# Patient Record
Sex: FEMALE | Race: WHITE | Employment: FULL TIME | URBAN - METROPOLITAN AREA
[De-identification: names, ages, dates, MRNs, and addresses within clinical notes are randomized per-mention and may not be internally consistent; named-entity substitution may affect disease eponyms.]

---

## 2017-01-27 ENCOUNTER — ALLIED HEALTH/NURSE VISIT (OUTPATIENT)
Dept: FAMILY MEDICINE | Facility: CLINIC | Age: 57
End: 2017-01-27

## 2017-01-27 VITALS — DIASTOLIC BLOOD PRESSURE: 84 MMHG | SYSTOLIC BLOOD PRESSURE: 154 MMHG

## 2017-01-27 DIAGNOSIS — I10 ESSENTIAL HYPERTENSION WITH GOAL BLOOD PRESSURE LESS THAN 130/85: Primary | ICD-10-CM

## 2017-01-27 NOTE — PROGRESS NOTES
Keisha Mcrae is enrolled/participating in the retail pharmacy Blood Pressure Goals Achievement Program (BPGAP).  Keisha Mcrae was evaluated at [facility] on January 27, 2017 at which time her blood pressure was:    BP Readings from Last 3 Encounters:   01/27/17 166/92   10/03/16 150/80   09/12/16 160/84     Reviewed lifestyle modifications for blood pressure control and reduction: including making healthy food choices, managing weight, getting regular exercise, smoking cessation, reducing alcohol consumption, monitoring blood pressure regularly.     Keisha Mcrae is not experiencing symptoms.    Follow-Up: BP is not at goal of < 140/90mmHg (patient 18+ years of age with or without diabetes), Recommended follow-up with PCP.  Routing to PCP for further review.    Completed by: Kelly Van, Pharm.D  Pharmacist in Charge  [facility]

## 2017-01-29 NOTE — PROGRESS NOTES
Records reviewed; BLOOD PRESSURE not to goal. It has been many months without adequate control. Medication list reviewed. List indicates many bp medications being used without success, including BB, CCB, ACE, Diuretic.  Follow up appt needed to reassess possible etiologies of her elevated bp and treatment options.    BP Readings from Last 3 Encounters:   01/27/17 154/84   10/03/16 150/80   09/12/16 160/84      Current Outpatient Prescriptions   Medication Sig Dispense Refill     metoprolol (LOPRESSOR) 25 MG tablet Take 1 tablet (25 mg) by mouth 2 times daily 60 tablet 3     citalopram (CELEXA) 10 MG tablet Take 1 tablet (10 mg) by mouth daily 90 tablet 1     hydrochlorothiazide (HYDRODIURIL) 25 MG tablet Take 1 tablet (25 mg) by mouth daily 30 tablet 3     lisinopril (PRINIVIL,ZESTRIL) 40 MG tablet Take 1 tablet (40 mg) by mouth daily 30 tablet 3     amLODIPine (NORVASC) 10 MG tablet Take 1 tablet (10 mg) by mouth daily 30 tablet 3        Lula Cuba MD  Internal Medicine  electronically signed

## 2017-02-07 ENCOUNTER — TELEPHONE (OUTPATIENT)
Dept: PHARMACY | Facility: CLINIC | Age: 57
End: 2017-02-07

## 2017-02-24 ENCOUNTER — DOCUMENTATION ONLY (OUTPATIENT)
Dept: FAMILY MEDICINE | Facility: CLINIC | Age: 57
End: 2017-02-24

## 2017-02-24 NOTE — PROGRESS NOTES
Panel Management Review      Patient has the following on her problem list:     Hypertension   Last three blood pressure readings:  BP Readings from Last 3 Encounters:   01/27/17 154/84   10/03/16 150/80   09/12/16 160/84     Blood pressure: Passed    HTN Guidelines:  Age 18-59 BP range:  Less than 140/90  Age 60-85 with Diabetes:  Less than 140/90  Age 60-85 without Diabetes:  less than 150/90      Composite cancer screening  Chart review shows that this patient is due/due soon for the following Colonoscopy  Summary:    Patient is due/failing the following:   COLONOSCOPY    Action needed:   Patient needs office visit for Colonoscopy/FIT Test.    Type of outreach:    Sent [employer] message.    Questions for provider review:    None                                                                                                                                    Adeline Elizondo CMA (AAMA) 2/24/2017 7:49 AM       Chart routed to  .

## 2017-02-27 ENCOUNTER — OFFICE VISIT (OUTPATIENT)
Dept: FAMILY MEDICINE | Facility: CLINIC | Age: 57
End: 2017-02-27
Payer: COMMERCIAL

## 2017-02-27 VITALS
OXYGEN SATURATION: 99 % | HEART RATE: 75 BPM | RESPIRATION RATE: 18 BRPM | SYSTOLIC BLOOD PRESSURE: 144 MMHG | TEMPERATURE: 97.9 F | WEIGHT: 225.7 LBS | BODY MASS INDEX: 36.99 KG/M2 | DIASTOLIC BLOOD PRESSURE: 86 MMHG

## 2017-02-27 DIAGNOSIS — Z12.11 SCREEN FOR COLON CANCER: ICD-10-CM

## 2017-02-27 DIAGNOSIS — F41.9 ANXIETY: ICD-10-CM

## 2017-02-27 DIAGNOSIS — I10 ESSENTIAL HYPERTENSION WITH GOAL BLOOD PRESSURE LESS THAN 130/85: Primary | ICD-10-CM

## 2017-02-27 PROCEDURE — 99214 OFFICE O/P EST MOD 30 MIN: CPT | Performed by: INTERNAL MEDICINE

## 2017-02-27 RX ORDER — METOPROLOL TARTRATE 25 MG/1
25 TABLET, FILM COATED ORAL 2 TIMES DAILY
Qty: 60 TABLET | Refills: 3 | Status: CANCELLED | OUTPATIENT
Start: 2017-02-27

## 2017-02-27 RX ORDER — CITALOPRAM HYDROBROMIDE 10 MG/1
10 TABLET ORAL DAILY
Qty: 90 TABLET | Refills: 1 | Status: SHIPPED | OUTPATIENT
Start: 2017-02-27 | End: 2017-06-15

## 2017-02-27 RX ORDER — METOPROLOL TARTRATE 50 MG
50 TABLET ORAL 2 TIMES DAILY
Qty: 180 TABLET | Refills: 1 | Status: SHIPPED | OUTPATIENT
Start: 2017-02-27 | End: 2017-06-15

## 2017-02-27 ASSESSMENT — ANXIETY QUESTIONNAIRES
7. FEELING AFRAID AS IF SOMETHING AWFUL MIGHT HAPPEN: NOT AT ALL
6. BECOMING EASILY ANNOYED OR IRRITABLE: SEVERAL DAYS
1. FEELING NERVOUS, ANXIOUS, OR ON EDGE: MORE THAN HALF THE DAYS
5. BEING SO RESTLESS THAT IT IS HARD TO SIT STILL: SEVERAL DAYS
IF YOU CHECKED OFF ANY PROBLEMS ON THIS QUESTIONNAIRE, HOW DIFFICULT HAVE THESE PROBLEMS MADE IT FOR YOU TO DO YOUR WORK, TAKE CARE OF THINGS AT HOME, OR GET ALONG WITH OTHER PEOPLE: SOMEWHAT DIFFICULT
GAD7 TOTAL SCORE: 6
2. NOT BEING ABLE TO STOP OR CONTROL WORRYING: NOT AT ALL
3. WORRYING TOO MUCH ABOUT DIFFERENT THINGS: SEVERAL DAYS

## 2017-02-27 ASSESSMENT — PATIENT HEALTH QUESTIONNAIRE - PHQ9: 5. POOR APPETITE OR OVEREATING: SEVERAL DAYS

## 2017-02-27 NOTE — PROGRESS NOTES
SUBJECTIVE:                                                    Keisha Mcrae is a 56 year old female who presents to clinic today for the following health issues:    HPI    Hypertension Follow-up    Outpatient blood pressures are being checked at home.  Results are 143-150/80-86.    Low Salt Diet: Not monitoring salt    Other concerns: Increased stressors at work have not only affected her mood, but they have also caused her blood pressure to remain elevated, despite using her medications as prescribed.     Anxiety Follow-Up    Status since last visit: Overall improved, but recent work stressors have been complicating things in her life, especially her anxiety and blood pressure.    Other associated symptoms:None    Complicating factors:   Significant life event: Work stressors, as noted.  Current substance abuse: None  Depression symptoms: No    JESS-7 SCORE 10/1/2014 4/18/2016 2/27/2017   Total Score 7 - -   Total Score - 4 6      GAD7     Problem list and histories reviewed & adjusted, as indicated.  Additional history: as documented    Labs reviewed in EPIC  Problem list, Medication list, Allergies, and Medical/Social/Surgical histories reviewed in [facility] and updated as appropriate.  Current Outpatient Prescriptions   Medication Sig Dispense Refill     metoprolol (LOPRESSOR) 25 MG tablet Take 1 tablet (25 mg) by mouth 2 times daily 60 tablet 3     citalopram (CELEXA) 10 MG tablet Take 1 tablet (10 mg) by mouth daily 90 tablet 1     hydrochlorothiazide (HYDRODIURIL) 25 MG tablet Take 1 tablet (25 mg) by mouth daily 30 tablet 3     lisinopril (PRINIVIL,ZESTRIL) 40 MG tablet Take 1 tablet (40 mg) by mouth daily 30 tablet 3     amLODIPine (NORVASC) 10 MG tablet Take 1 tablet (10 mg) by mouth daily 30 tablet 3        REVIEW OF SYSTEMS:  C: NEGATIVE for fever, chills, or change in weight  R: NEGATIVE for significant cough or SOB  CV: Hx of and POSITIVE for Hypertension - use of Amlodipine, Hydrochlorothiazide,  Lisinopril, and Metoprolol; NEGATIVE for chest pain, palpitations or peripheral edema  GI: no bowel changes;   : no urinary symptoms   N: NEGATIVE for weakness, dizziness or paresthesias  P: Anxiety - use of Citalopram, however, she has been out of Citalopram for about a month; NEGATIVE for changes in mood or affect    This document serves as a record of the services and decisions personally performed and made by Lula Cuba MD. It was created on her behalf by Divya Rodriguez, a trained medical scribe. The creation of this document is based the provider's statements to the medical scribe.  Divya Rodriguez, February 27, 2017 4:50 PM     OBJECTIVE:                                                    /86  Pulse 75  Temp 97.9  F (36.6  C) (Oral)  Resp 18  Wt 225 lb 11.2 oz (102.4 kg)  LMP 03/21/1999  SpO2 99%  BMI 36.99 kg/m2  There is no height or weight on file to calculate BMI.    EXAM:  GENERAL: Patient appears healthy, alert and not distressed.  EYES: Eyes appear grossly normal to inspection, with normal conjunctivae and sclerae  NECK: No adenopathy present, no asymmetry, masses, or scars noted, thyroid is normal to palpation  RESP: Lungs are clear to auscultation - no rales, rhonchi or wheezes present  CV: Regular rate and rhythm, normal S1 S2 heart sounds, no ectopy, no peripheral edema present, peripheral pulses normal, no carotid bruit.  NEURO: Patient exhibits normal strength and tone, normal speech and mentation  PSYCH: Mentation appears normal, affect is normal/bright    Diagnostic Test Results:  No results found for this or any previous visit (from the past 24 hour(s)).      ASSESSMENT/PLAN:                                                    (I10) Essential hypertension with goal blood pressure less than 130/85  (primary encounter diagnosis)  Comment: Blood pressure remains elevated despite the use of Amlodipine, Hydrochlorothiazide, Lisinopril, and Metoprolol; Increased her daily dose of Metoprolol  from 50 to 100 MG, which should help reduce not only her blood pressure but her increased anxiousness; Will continue to monitor.  Plan: metoprolol (LOPRESSOR) 50 MG tablet          (F41.9) Anxiety  Comment: Increased stressors at work; Mood has otherwise been good despite these stressors and having gone without Citalopram for an entire month; Requesting refills; No changes in medication or dosing; Will continue to monitor her mood.  Plan: citalopram (CELEXA) 10 MG tablet          (Z12.11) Screen for colon cancer  Comment: Due for additional colorectal cancer screening; Elects the FIT test as her primary screening tool; Will return as soon as she is able to complete it.  Plan: Fecal colorectal cancer screen (FIT)              The information in this document, created by a medical scribe for me, accurately reflects the services I personally performed and the decisions made by me. I have reviewed and approved this document for accuracy.  Dr. Lula Cuba, 5:03 PM, February 27, 2017    Lula Cuba MD  Internal Medicine   [facility]

## 2017-02-27 NOTE — MR AVS SNAPSHOT
After Visit Summary   2/27/2017    Keisha Mcrae    MRN: [id number]           Patient Information     Date Of Birth          1960        Visit Information        Provider Department      2/27/2017 4:15 PM Lula Cuba MD [facility]        Today's Diagnoses     Essential hypertension with goal blood pressure less than 130/85    -  1    Anxiety        Screen for colon cancer           Follow-ups after your visit        Future tests that were ordered for you today     Open Future Orders        Priority Expected Expires Ordered    Fecal colorectal cancer screen (FIT) Routine 3/20/2017 5/22/2017 2/27/2017            Who to contact     If you have questions or need follow up information about today's clinic visit or your schedule please contact [facility] directly at [phone].  Normal or non-critical lab and imaging results will be communicated to you by MyChart, letter or phone within 4 business days after the clinic has received the results. If you do not hear from us within 7 days, please contact the clinic through [employer] or phone. If you have a critical or abnormal lab result, we will notify you by phone as soon as possible.  Submit refill requests through [employer] or call your pharmacy and they will forward the refill request to us. Please allow 3 business days for your refill to be completed.          Additional Information About Your Visit        MyChart Information     [employer] gives you secure access to your electronic health record. If you see a primary care provider, you can also send messages to your care team and make appointments. If you have questions, please call your primary care clinic.  If you do not have a primary care provider, please call [phone] and they will assist you.        Care EveryWhere ID     This is your Care EveryWhere ID. This could be used by other organizations to access your [place] medical records  [id number]         Your Vitals Were     Pulse Temperature Respirations Last Period Pulse Oximetry BMI (Body Mass Index)    75 97.9  F (36.6  C) (Oral) 18 03/21/1999 99% 36.99 kg/m2       Blood Pressure from Last 3 Encounters:   02/27/17 144/86   01/27/17 154/84   10/03/16 150/80    Weight from Last 3 Encounters:   02/27/17 225 lb 11.2 oz (102.4 kg)   10/03/16 219 lb 14.4 oz (99.7 kg)   06/13/16 218 lb (98.9 kg)                 Today's Medication Changes          These changes are accurate as of: 2/27/17  5:01 PM.  If you have any questions, ask your nurse or doctor.               These medicines have changed or have updated prescriptions.        Dose/Directions    metoprolol 50 MG tablet   Commonly known as:  LOPRESSOR   This may have changed:    - medication strength  - how much to take   Used for:  Essential hypertension with goal blood pressure less than 130/85   Changed by:  Lula Cuba MD        Dose:  50 mg   Take 1 tablet (50 mg) by mouth 2 times daily   Quantity:  180 tablet   Refills:  1            Where to get your medicines      These medications were sent to [facility] [address]  [address] [facility] [address]     Phone:  [phone]     citalopram 10 MG tablet    metoprolol 50 MG tablet                Primary Care Provider Office Phone # Fax #    Lula Cuba [phone]       [facility] [address]  [facility] [address]        Thank you!     Thank you for choosing [facility]  for your care. Our goal is always to provide you with excellent care. Hearing back from our patients is one way we can continue to improve our services. Please take a few minutes to complete the written survey that you may receive in the mail after your visit with us. Thank you!             Your Updated Medication List - Protect others around you: Learn how to safely use, store and throw away your medicines at  www.disposemymeds.org.          This list is accurate as of: 2/27/17  5:01 PM.  Always use your most recent med list.                   Brand Name Dispense Instructions for use    amLODIPine 10 MG tablet    NORVASC    30 tablet    Take 1 tablet (10 mg) by mouth daily       citalopram 10 MG tablet    celeXA    90 tablet    Take 1 tablet (10 mg) by mouth daily       hydrochlorothiazide 25 MG tablet    HYDRODIURIL    30 tablet    Take 1 tablet (25 mg) by mouth daily       lisinopril 40 MG tablet    PRINIVIL/ZESTRIL    30 tablet    Take 1 tablet (40 mg) by mouth daily       metoprolol 50 MG tablet    LOPRESSOR    180 tablet    Take 1 tablet (50 mg) by mouth 2 times daily

## 2017-02-27 NOTE — NURSING NOTE
"Chief Complaint   Patient presents with     Hypertension     Anxiety       Initial BP (!) 162/100 (BP Location: Right arm, Patient Position: Chair, Cuff Size: Adult Large)  Pulse 75  Temp 97.9  F (36.6  C) (Oral)  Resp 18  Wt 225 lb 11.2 oz (102.4 kg)  LMP 03/21/1999  SpO2 99%  BMI 36.99 kg/m2 Estimated body mass index is 36.99 kg/(m^2) as calculated from the following:    Height as of 5/3/16: 5' 5.5\" (1.664 m).    Weight as of this encounter: 225 lb 11.2 oz (102.4 kg).  Medication Reconciliation: complete    "

## 2017-02-28 ASSESSMENT — PATIENT HEALTH QUESTIONNAIRE - PHQ9: SUM OF ALL RESPONSES TO PHQ QUESTIONS 1-9: 7

## 2017-02-28 ASSESSMENT — ANXIETY QUESTIONNAIRES: GAD7 TOTAL SCORE: 6

## 2017-03-09 DIAGNOSIS — I10 ESSENTIAL HYPERTENSION WITH GOAL BLOOD PRESSURE LESS THAN 130/85: ICD-10-CM

## 2017-03-09 RX ORDER — LISINOPRIL 40 MG/1
40 TABLET ORAL DAILY
Qty: 30 TABLET | Refills: 1 | Status: SHIPPED | OUTPATIENT
Start: 2017-03-09 | End: 2017-06-15

## 2017-03-09 RX ORDER — HYDROCHLOROTHIAZIDE 25 MG/1
25 TABLET ORAL DAILY
Qty: 30 TABLET | Refills: 1 | Status: SHIPPED | OUTPATIENT
Start: 2017-03-09 | End: 2017-06-15

## 2017-03-09 RX ORDER — AMLODIPINE BESYLATE 10 MG/1
10 TABLET ORAL DAILY
Qty: 30 TABLET | Refills: 1 | Status: SHIPPED | OUTPATIENT
Start: 2017-03-09 | End: 2017-06-15

## 2017-03-09 NOTE — TELEPHONE ENCOUNTER
Approved per pharmacist refill protocol 2 months only. Patient due for labs in April, please order BMP. BP is not at goal. Thanks.   Nasreen Ordonez PharmD   [facility]  [email or web address]   Float pharmacist on behalf of [facility].

## 2017-03-09 NOTE — TELEPHONE ENCOUNTER
Amlodipine 10mg      Last Written Prescription Date: 10/3/106  Last Fill Quantity: 30, # refills: 03  Last Office Visit with [facility], [facility] or [facility] prescribing provider:  2/27/17   Future Office Visit:        BP Readings from Last 3 Encounters:   02/27/17 144/86   01/27/17 154/84   10/03/16 150/80             Lisinopril 40mg      Last Written Prescription Date: 10/3/16  Last Fill Quantity: 30, # refills: 3  Last Office Visit with [facility], [facility] or [facility] prescribing provider: 2/27/17       Potassium   Date Value Ref Range Status   04/18/2016 4.1 3.4 - 5.3 mmol/L Final     Creatinine   Date Value Ref Range Status   04/18/2016 0.84 0.52 - 1.04 mg/dL Final     BP Readings from Last 3 Encounters:   02/27/17 144/86   01/27/17 154/84   10/03/16 150/80               Hydrochlorothiazide 25mg      Last Written Prescription Date: 10/3/16  Last Fill Quantity: 30, # refills: 3  Last Office Visit with [facility], [facility] or [facility] prescribing provider: 2/27/17       Potassium   Date Value Ref Range Status   04/18/2016 4.1 3.4 - 5.3 mmol/L Final     Creatinine   Date Value Ref Range Status   04/18/2016 0.84 0.52 - 1.04 mg/dL Final     BP Readings from Last 3 Encounters:   02/27/17 144/86   01/27/17 154/84   10/03/16 150/80               Lori De Leon, Technician ([facility])  [facility]

## 2017-03-10 NOTE — TELEPHONE ENCOUNTER
Patient will  her medications today and call back to schedule a follow up.    Jolene DELGADILLO    [facility] Gunjan Anders

## 2017-03-10 NOTE — TELEPHONE ENCOUNTER
BP Readings from Last 3 Encounters:   02/27/17 144/86   01/27/17 154/84   10/03/16 150/80    BLOOD PRESSURE not to goal; recommended follow up with PCP within 2 weeks of th 2/27/2017 appt in order to optimize bp   please help facilitate follow up bp.

## 2017-06-09 ENCOUNTER — ALLIED HEALTH/NURSE VISIT (OUTPATIENT)
Dept: FAMILY MEDICINE | Facility: CLINIC | Age: 57
End: 2017-06-09
Payer: COMMERCIAL

## 2017-06-09 VITALS — DIASTOLIC BLOOD PRESSURE: 92 MMHG | SYSTOLIC BLOOD PRESSURE: 142 MMHG

## 2017-06-09 DIAGNOSIS — Z01.30 BP CHECK: Primary | ICD-10-CM

## 2017-06-09 DIAGNOSIS — I10 ESSENTIAL HYPERTENSION WITH GOAL BLOOD PRESSURE LESS THAN 130/85: ICD-10-CM

## 2017-06-09 PROCEDURE — 99207 ZZC NO CHARGE NURSE ONLY: CPT | Performed by: FAMILY MEDICINE

## 2017-06-09 NOTE — Clinical Note
Routing message to PCP for review -BP checked at pharmacy and noted to be above goal. Recommended patient follow-up with PCP. Patient is out of refills for lisinopril, HCTZ and amlodipine. Given 3-day supply of lisinopril and HCTZ today. Last fill of amlodipine picked up today. Instructed patient to set up appointment with PCP.

## 2017-06-09 NOTE — MR AVS SNAPSHOT
After Visit Summary   6/9/2017    Keisha Mcrae    MRN: [id number]           Patient Information     Date Of Birth          1960        Visit Information        Provider Department      6/9/2017 4:32 PM Lula Cuba MD [facility]        Today's Diagnoses     BP check    -  1    Essential hypertension with goal blood pressure less than 130/85           Follow-ups after your visit        Your next 10 appointments already scheduled     Jun 29, 2017  8:15 AM CDT   MA SCREENING DIGITAL BILATERAL with [facility]   [facility]    [address] [facility] [address] [phone]           Do not use any powder, lotion or deodorant under your arms or on your breast. If you do, we will ask you to remove it before your exam.  Wear comfortable, two-piece clothing.  If you have any allergies, tell your care team.  Bring any previous mammograms from other facilities or have them mailed to the breast center.              Future tests that were ordered for you today     Open Future Orders        Priority Expected Expires Ordered    MA Screening Digital Bilateral Routine  6/15/2018 6/15/2017    Comprehensive metabolic panel Routine 8/1/2017 10/1/2017 6/15/2017    Lipid panel reflex to direct LDL Routine 8/1/2017 10/1/2017 6/15/2017    Albumin Random Urine Quantitative Routine 8/1/2017 10/1/2017 6/15/2017    Hemoglobin A1c Routine 8/1/2017 10/1/2017 6/15/2017    Fecal colorectal cancer screen FIT Routine 7/6/2017 9/7/2017 6/15/2017            Who to contact     If you have questions or need follow up information about today's clinic visit or your schedule please contact [facility] directly at [phone].  Normal or non-critical lab and imaging results will be communicated to you by MyChart, letter or phone within 4 business days after the clinic has received the results. If you do not hear from us within 7 days,  please contact the clinic through [employer] or phone. If you have a critical or abnormal lab result, we will notify you by phone as soon as possible.  Submit refill requests through [employer] or call your pharmacy and they will forward the refill request to us. Please allow 3 business days for your refill to be completed.          Additional Information About Your Visit        [employer] Information     [employer] gives you secure access to your electronic health record. If you see a primary care provider, you can also send messages to your care team and make appointments. If you have questions, please call your primary care clinic.  If you do not have a primary care provider, please call [phone] and they will assist you.        Care EveryWhere ID     This is your Care EveryWhere ID. This could be used by other organizations to access your [place] medical records  [id number]        Your Vitals Were     Last Period                   03/21/1999            Blood Pressure from Last 3 Encounters:   06/15/17 132/86   06/09/17 (!) 142/92   02/27/17 144/86    Weight from Last 3 Encounters:   06/15/17 223 lb 1.6 oz (101.2 kg)   02/27/17 225 lb 11.2 oz (102.4 kg)   10/03/16 219 lb 14.4 oz (99.7 kg)              Today, you had the following     No orders found for display       Primary Care Provider Office Phone # Fax #    Lula Cuba [phone]       [facility] [address] [facility] [address]        Thank you!     Thank you for choosing [facility]  for your care. Our goal is always to provide you with excellent care. Hearing back from our patients is one way we can continue to improve our services. Please take a few minutes to complete the written survey that you may receive in the mail after your visit with us. Thank you!             Your Updated Medication List - Protect others around you: Learn how to safely use, store and throw away your medicines at  www.disposemymeds.org.      Notice  As of 6/9/2017 11:59 PM    You have not been prescribed any medications.

## 2017-06-09 NOTE — Clinical Note
Routing message to PCP for review -BP checked at pharmacy and noted to be above goal. Recommended patient follow-up with PCP. Patient is our of refills for lisinopril, HCTZ and amlodipine. Given a 3-day supply of lisinopril and HCTZ today. Amlodipine filled today. Instructed patient to set up appt with PCP.

## 2017-06-09 NOTE — PROGRESS NOTES
Keisha Mcrae is enrolled/participating in the retail pharmacy Blood Pressure Goals Achievement Program (BPGAP).  Keisha Mcrae was evaluated at [facility] on June 9, 2017 at which time her blood pressure was:    BP Readings from Last 3 Encounters:   06/09/17 (!) 142/92   02/27/17 144/86   01/27/17 154/84     Reviewed lifestyle modifications for blood pressure control and reduction: including making healthy food choices, managing weight, getting regular exercise, smoking cessation, reducing alcohol consumption, monitoring blood pressure regularly.     Keisha Mcrae is not experiencing symptoms.    Follow-Up: BP is not at goal of < 130/85mmHg (patient 18+ years of age with or without diabetes), Recommended follow-up with PCP.  Routing to PCP for further review.    Keisha Mcrae was evaluated for enrollment into the PGEN study today.    Patient eligible for enrollment:  No  Patient interested in enrollment:  Unknown    Completed by: Lois Hussein, PharmD-4

## 2017-06-15 ENCOUNTER — OFFICE VISIT (OUTPATIENT)
Dept: FAMILY MEDICINE | Facility: CLINIC | Age: 57
End: 2017-06-15
Payer: COMMERCIAL

## 2017-06-15 VITALS
OXYGEN SATURATION: 99 % | HEART RATE: 65 BPM | SYSTOLIC BLOOD PRESSURE: 132 MMHG | WEIGHT: 223.1 LBS | BODY MASS INDEX: 35.86 KG/M2 | TEMPERATURE: 98.6 F | HEIGHT: 66 IN | DIASTOLIC BLOOD PRESSURE: 86 MMHG

## 2017-06-15 DIAGNOSIS — I10 ESSENTIAL HYPERTENSION WITH GOAL BLOOD PRESSURE LESS THAN 130/85: Primary | ICD-10-CM

## 2017-06-15 DIAGNOSIS — Z12.11 SPECIAL SCREENING FOR MALIGNANT NEOPLASMS, COLON: ICD-10-CM

## 2017-06-15 DIAGNOSIS — E66.01 MORBID OBESITY, UNSPECIFIED OBESITY TYPE (H): ICD-10-CM

## 2017-06-15 DIAGNOSIS — Z12.31 VISIT FOR SCREENING MAMMOGRAM: ICD-10-CM

## 2017-06-15 DIAGNOSIS — F41.9 ANXIETY: ICD-10-CM

## 2017-06-15 DIAGNOSIS — Z13.6 CARDIOVASCULAR SCREENING; LDL GOAL LESS THAN 130: ICD-10-CM

## 2017-06-15 PROCEDURE — 99214 OFFICE O/P EST MOD 30 MIN: CPT | Performed by: INTERNAL MEDICINE

## 2017-06-15 RX ORDER — HYDROCHLOROTHIAZIDE 25 MG/1
25 TABLET ORAL DAILY
Qty: 30 TABLET | Refills: 11 | Status: SHIPPED | OUTPATIENT
Start: 2017-06-15 | End: 2018-10-17

## 2017-06-15 RX ORDER — METOPROLOL TARTRATE 50 MG
50 TABLET ORAL 2 TIMES DAILY
Qty: 180 TABLET | Refills: 1 | Status: SHIPPED | OUTPATIENT
Start: 2017-06-15 | End: 2018-10-17

## 2017-06-15 RX ORDER — AMLODIPINE BESYLATE 10 MG/1
10 TABLET ORAL DAILY
Qty: 30 TABLET | Refills: 11 | Status: SHIPPED | OUTPATIENT
Start: 2017-06-15 | End: 2018-10-17

## 2017-06-15 RX ORDER — LISINOPRIL 40 MG/1
40 TABLET ORAL DAILY
Qty: 30 TABLET | Refills: 11 | Status: SHIPPED | OUTPATIENT
Start: 2017-06-15 | End: 2018-10-17

## 2017-06-15 RX ORDER — CITALOPRAM HYDROBROMIDE 10 MG/1
10 TABLET ORAL DAILY
Qty: 90 TABLET | Refills: 1 | Status: SHIPPED | OUTPATIENT
Start: 2017-06-15 | End: 2018-11-15

## 2017-06-15 ASSESSMENT — ANXIETY QUESTIONNAIRES
GAD7 TOTAL SCORE: 8
2. NOT BEING ABLE TO STOP OR CONTROL WORRYING: SEVERAL DAYS
1. FEELING NERVOUS, ANXIOUS, OR ON EDGE: SEVERAL DAYS
6. BECOMING EASILY ANNOYED OR IRRITABLE: SEVERAL DAYS
5. BEING SO RESTLESS THAT IT IS HARD TO SIT STILL: SEVERAL DAYS
IF YOU CHECKED OFF ANY PROBLEMS ON THIS QUESTIONNAIRE, HOW DIFFICULT HAVE THESE PROBLEMS MADE IT FOR YOU TO DO YOUR WORK, TAKE CARE OF THINGS AT HOME, OR GET ALONG WITH OTHER PEOPLE: SOMEWHAT DIFFICULT
7. FEELING AFRAID AS IF SOMETHING AWFUL MIGHT HAPPEN: SEVERAL DAYS
3. WORRYING TOO MUCH ABOUT DIFFERENT THINGS: SEVERAL DAYS

## 2017-06-15 ASSESSMENT — PATIENT HEALTH QUESTIONNAIRE - PHQ9: 5. POOR APPETITE OR OVEREATING: MORE THAN HALF THE DAYS

## 2017-06-15 NOTE — NURSING NOTE
"Chief Complaint   Patient presents with     Hypertension       Initial BP (!) 136/98  Pulse 65  Temp 98.6  F (37  C) (Oral)  Ht 5' 5.5\" (1.664 m)  Wt 223 lb 1.6 oz (101.2 kg)  LMP 03/21/1999  SpO2 99%  BMI 36.56 kg/m2 Estimated body mass index is 36.56 kg/(m^2) as calculated from the following:    Height as of this encounter: 5' 5.5\" (1.664 m).    Weight as of this encounter: 223 lb 1.6 oz (101.2 kg).  Medication Reconciliation: complete  "

## 2017-06-15 NOTE — PROGRESS NOTES
SUBJECTIVE:                                                    Keisha Mcrae is a 57 year old female who presents to clinic today for the following health issues:    Hypertension Follow-up      Outpatient blood pressures are not being checked.    Low Salt Diet: no added salt  BP Readings from Last 3 Encounters:   06/15/17 132/86   06/09/17 (!) 142/92   02/27/17 144/86          Amount of exercise or physical activity: 1 day/week for an average of 45-60 minutes    Problems taking medications regularly: No    Medication side effects: none    Diet: regular (no restrictions)    Anxiety Follow-Up      Status since last visit: Improved     Other associated symptoms: None    Complicating factors:   Significant life event: No   Current substance abuse: None  Depression symptoms: No  JESS-7 SCORE 4/18/2016 2/27/2017 6/15/2017   Total Score - - -   Total Score 4 6 8      GAD7     Problem list and histories reviewed & adjusted, as indicated.  Additional history: as documented    BP Readings from Last 3 Encounters:   06/15/17 132/86   06/09/17 (!) 142/92   02/27/17 144/86    Wt Readings from Last 3 Encounters:   06/15/17 223 lb 1.6 oz (101.2 kg)   02/27/17 225 lb 11.2 oz (102.4 kg)   10/03/16 219 lb 14.4 oz (99.7 kg)        Reviewed and updated as needed this visit by clinical staff  Tobacco  Allergies  Meds  Problems  Med Hx  Surg Hx  Fam Hx  Soc Hx        Reviewed and updated as needed this visit by Provider  Allergies  Meds  Problems       ROS:  CONSTITUTIONAL: NEGATIVE for fever, chills, change in weight  RESP: Recent sickness and coughing now improved, NEGATIVE for significant cough or SOB  CV: HTN - on amlodipine, HCT, lisinopril, and metoprolol; NEGATIVE for chest pain, palpitations or peripheral edema  GI: NEGATIVE for nausea, abdominal pain, heartburn, or change in bowel habits  MUSCULOSKELETAL: POSITIVE for mild left sided rib pain after having cough, NEGATIVE for significant arthralgias or myalgia  NEURO:  "NEGATIVE for weakness, dizziness or paresthesias  ENDOCRINE: NEGATIVE for temperature intolerance, skin/hair changes  HEME/ALLERGY/IMMUNE: NEGATIVE for bleeding problems  PSYCHIATRIC: Anxiety - on citalopram, mild mood changes while sick recently; NEGATIVE for changes in mood or affect    This document serves as a record of the services and decisions personally performed and made by Lula Cuba MD. It was created on her behalf by Cheryl Gonzalez, a trained medical scribe. The creation of this document is based on the provider's statements to the medical scribe.   Cheryl Gonzalez, 3:49 PM, Daisy 15, 2017    OBJECTIVE:                                                    /86  Pulse 65  Temp 98.6  F (37  C) (Oral)  Ht 5' 5.5\" (1.664 m)  Wt 223 lb 1.6 oz (101.2 kg)  LMP 03/21/1999  SpO2 99%  BMI 36.56 kg/m2  Body mass index is 36.56 kg/(m^2).  GENERAL APPEARANCE: healthy, alert and no distress  NECK: no bruits  RESP: lungs clear to auscultation - no rales, rhonchi or wheezes  CV: regular rates and rhythm and normal S1 S2, no peripheral edema   ABDOMEN: soft, nontender, and bowel sounds normal  MS: ribs nontender to palpation or percussion, extremities normal- no gross deformities noted  NEURO: mentation intact and speech normal  PSYCH: mentation appears normal and affect normal/bright    Diagnostic Test Results:  none      ASSESSMENT/PLAN:                                                    (I10) Essential hypertension with goal blood pressure less than 130/85 (primary encounter diagnosis)  Comment: 4 agents to control BP; BP reviewed and well-controlled; medications well-tolerated; will continue to monitor; no change in medications/dosage   Plan: amLODIPine (NORVASC) 10 MG tablet,         hydrochlorothiazide (HYDRODIURIL) 25 MG tablet,        lisinopril (PRINIVIL/ZESTRIL) 40 MG tablet,         metoprolol (LOPRESSOR) 50 MG tablet,         Comprehensive metabolic panel, Lipid panel         reflex to direct LDL, " "Albumin Random Urine         Quantitative          (F41.9) Anxiety  Comment: citalopram well-tolerated, was feeling off while sick recently, will continue to monitor, no change in med/dosage   Plan: citalopram (CELEXA) 10 MG tablet          (E66.01) Morbid obesity, unspecified obesity type (H)  Comment: with comorbity- htn; pt reports altered diet - no longer having fast food for breakfast, light lunch (salad), protein and salad at night, increased fluid intake, trying to not eat \"anything with a label,\" more fresh options; also trying to increase activity, has fitness tracker  Body mass index is 36.56 kg/(m^2)  Plan: Hemoglobin A1c          (Z12.11) Special screening for malignant neoplasms, colon  Comment: due for colon cancer screening, pt opts for FIT test at this time, plans to undergo colonoscopy   Plan: Fecal colorectal cancer screen FIT          (Z13.6) CARDIOVASCULAR SCREENING; LDL GOAL LESS THAN 130  Comment: due for fasting labs, plans to have done in August, future lab orders placed   Lab Results   Component Value Date     04/02/2014   Plan: Comprehensive metabolic panel, Lipid panel         reflex to direct LDL          MEDICATIONS:   Orders Placed This Encounter   Medications     amLODIPine (NORVASC) 10 MG tablet     Sig: Take 1 tablet (10 mg) by mouth daily     Dispense:  30 tablet     Refill:  11     hydrochlorothiazide (HYDRODIURIL) 25 MG tablet     Sig: Take 1 tablet (25 mg) by mouth daily     Dispense:  30 tablet     Refill:  11     lisinopril (PRINIVIL/ZESTRIL) 40 MG tablet     Sig: Take 1 tablet (40 mg) by mouth daily     Dispense:  30 tablet     Refill:  11     citalopram (CELEXA) 10 MG tablet     Sig: Take 1 tablet (10 mg) by mouth daily     Dispense:  90 tablet     Refill:  1     metoprolol (LOPRESSOR) 50 MG tablet     Sig: Take 1 tablet (50 mg) by mouth 2 times daily     Dispense:  180 tablet     Refill:  1     Medications Discontinued During This Encounter   Medication Reason     " amLODIPine (NORVASC) 10 MG tablet Reorder     hydrochlorothiazide (HYDRODIURIL) 25 MG tablet Reorder     lisinopril (PRINIVIL/ZESTRIL) 40 MG tablet Reorder     citalopram (CELEXA) 10 MG tablet Reorder     metoprolol (LOPRESSOR) 50 MG tablet Reorder     FUTURE LABS:       - Schedule a fasting blood draw 8/2017        Lula Cuba MD  Internal Medicine  [facility]    The information in this document, created by a medical scribe for me, accurately reflects the services I personally performed and the decisions made by me. I have reviewed and approved this document for accuracy.  Dr. Lula Cuba, 4:04 PM, Daisy 15, 2017

## 2017-06-15 NOTE — MR AVS SNAPSHOT
After Visit Summary   6/15/2017    Keisha Mcrae    MRN: [id number]           Patient Information     Date Of Birth          1960        Visit Information        Provider Department      6/15/2017 3:30 PM Lula Cuba MD [facility]        Today's Diagnoses     Essential hypertension with goal blood pressure less than 130/85    -  1    Anxiety        Morbid obesity, unspecified obesity type (H)        Special screening for malignant neoplasms, colon        CARDIOVASCULAR SCREENING; LDL GOAL LESS THAN 130           Follow-ups after your visit        Future tests that were ordered for you today     Open Future Orders        Priority Expected Expires Ordered    Comprehensive metabolic panel Routine 8/1/2017 10/1/2017 6/15/2017    Lipid panel reflex to direct LDL Routine 8/1/2017 10/1/2017 6/15/2017    Albumin Random Urine Quantitative Routine 8/1/2017 10/1/2017 6/15/2017    Hemoglobin A1c Routine 8/1/2017 10/1/2017 6/15/2017    Fecal colorectal cancer screen FIT Routine 7/6/2017 9/7/2017 6/15/2017            Who to contact     If you have questions or need follow up information about today's clinic visit or your schedule please contact [facility] directly at [phone].  Normal or non-critical lab and imaging results will be communicated to you by MyChart, letter or phone within 4 business days after the clinic has received the results. If you do not hear from us within 7 days, please contact the clinic through MyChart or phone. If you have a critical or abnormal lab result, we will notify you by phone as soon as possible.  Submit refill requests through [employer] or call your pharmacy and they will forward the refill request to us. Please allow 3 business days for your refill to be completed.          Additional Information About Your Visit        MyChart Information     [employer] gives you secure access to your electronic health record. If you see a primary  "care provider, you can also send messages to your care team and make appointments. If you have questions, please call your primary care clinic.  If you do not have a primary care provider, please call [phone] and they will assist you.        Care EveryWhere ID     This is your Care EveryWhere ID. This could be used by other organizations to access your [facility] medical records  [id number]        Your Vitals Were     Pulse Temperature Height Last Period Pulse Oximetry BMI (Body Mass Index)    65 98.6  F (37  C) (Oral) 5' 5.5\" (1.664 m) 03/21/1999 99% 36.56 kg/m2       Blood Pressure from Last 3 Encounters:   06/15/17 132/86   06/09/17 (!) 142/92   02/27/17 144/86    Weight from Last 3 Encounters:   06/15/17 223 lb 1.6 oz (101.2 kg)   02/27/17 225 lb 11.2 oz (102.4 kg)   10/03/16 219 lb 14.4 oz (99.7 kg)                 Where to get your medicines      These medications were sent to [facility] - [place], [place] - [address]  [address] [facility] [address]     Phone:  [phone]     amLODIPine 10 MG tablet    citalopram 10 MG tablet    hydrochlorothiazide 25 MG tablet    lisinopril 40 MG tablet    metoprolol 50 MG tablet          Primary Care Provider Office Phone # Fax #    Lula Cuba [phone]       [facility] [address]  [facility] [address]        Thank you!     Thank you for choosing [facility]  for your care. Our goal is always to provide you with excellent care. Hearing back from our patients is one way we can continue to improve our services. Please take a few minutes to complete the written survey that you may receive in the mail after your visit with us. Thank you!             Your Updated Medication List - Protect others around you: Learn how to safely use, store and throw away your medicines at www.disposemymeds.org.          This list is accurate as of: 6/15/17  4:02 PM.  Always use your most recent " med list.                   Brand Name Dispense Instructions for use    amLODIPine 10 MG tablet    NORVASC    30 tablet    Take 1 tablet (10 mg) by mouth daily       citalopram 10 MG tablet    celeXA    90 tablet    Take 1 tablet (10 mg) by mouth daily       hydrochlorothiazide 25 MG tablet    HYDRODIURIL    30 tablet    Take 1 tablet (25 mg) by mouth daily       lisinopril 40 MG tablet    PRINIVIL/ZESTRIL    30 tablet    Take 1 tablet (40 mg) by mouth daily       metoprolol 50 MG tablet    LOPRESSOR    180 tablet    Take 1 tablet (50 mg) by mouth 2 times daily

## 2017-06-16 ASSESSMENT — ANXIETY QUESTIONNAIRES: GAD7 TOTAL SCORE: 8

## 2017-06-16 ASSESSMENT — PATIENT HEALTH QUESTIONNAIRE - PHQ9: SUM OF ALL RESPONSES TO PHQ QUESTIONS 1-9: 5

## 2017-06-25 PROBLEM — E66.01 MORBID OBESITY, UNSPECIFIED OBESITY TYPE (H): Status: ACTIVE | Noted: 2017-06-25

## 2017-06-29 ENCOUNTER — RADIANT APPOINTMENT (OUTPATIENT)
Dept: MAMMOGRAPHY | Facility: CLINIC | Age: 57
End: 2017-06-29
Attending: INTERNAL MEDICINE
Payer: COMMERCIAL

## 2017-06-29 DIAGNOSIS — Z12.31 VISIT FOR SCREENING MAMMOGRAM: ICD-10-CM

## 2017-06-29 PROCEDURE — G0202 SCR MAMMO BI INCL CAD: HCPCS | Mod: TC

## 2017-12-01 ENCOUNTER — HEALTH MAINTENANCE LETTER (OUTPATIENT)
Age: 57
End: 2017-12-01

## 2018-08-31 ENCOUNTER — HEALTH MAINTENANCE LETTER (OUTPATIENT)
Age: 58
End: 2018-08-31

## 2018-11-15 ENCOUNTER — OFFICE VISIT (OUTPATIENT)
Dept: FAMILY MEDICINE | Facility: CLINIC | Age: 58
End: 2018-11-15
Payer: COMMERCIAL

## 2018-11-15 VITALS
TEMPERATURE: 99.1 F | HEIGHT: 66 IN | SYSTOLIC BLOOD PRESSURE: 130 MMHG | RESPIRATION RATE: 17 BRPM | WEIGHT: 214.9 LBS | OXYGEN SATURATION: 96 % | HEART RATE: 77 BPM | BODY MASS INDEX: 34.54 KG/M2 | DIASTOLIC BLOOD PRESSURE: 78 MMHG

## 2018-11-15 DIAGNOSIS — Z51.81 ENCOUNTER FOR MONITORING DIURETIC THERAPY: ICD-10-CM

## 2018-11-15 DIAGNOSIS — Z12.31 VISIT FOR SCREENING MAMMOGRAM: ICD-10-CM

## 2018-11-15 DIAGNOSIS — Z12.11 SCREEN FOR COLON CANCER: ICD-10-CM

## 2018-11-15 DIAGNOSIS — Z00.00 ROUTINE HISTORY AND PHYSICAL EXAMINATION OF ADULT: Primary | ICD-10-CM

## 2018-11-15 DIAGNOSIS — F41.9 ANXIETY: ICD-10-CM

## 2018-11-15 DIAGNOSIS — Z13.6 CARDIOVASCULAR SCREENING; LDL GOAL LESS THAN 130: ICD-10-CM

## 2018-11-15 DIAGNOSIS — I10 ESSENTIAL HYPERTENSION WITH GOAL BLOOD PRESSURE LESS THAN 130/85: ICD-10-CM

## 2018-11-15 DIAGNOSIS — Z79.899 ENCOUNTER FOR MONITORING DIURETIC THERAPY: ICD-10-CM

## 2018-11-15 DIAGNOSIS — R73.09 ELEVATED HEMOGLOBIN A1C: ICD-10-CM

## 2018-11-15 LAB
ALBUMIN SERPL-MCNC: 4.1 G/DL (ref 3.4–5)
ALP SERPL-CCNC: 71 U/L (ref 40–150)
ALT SERPL W P-5'-P-CCNC: 47 U/L (ref 0–50)
ANION GAP SERPL CALCULATED.3IONS-SCNC: 9 MMOL/L (ref 3–14)
AST SERPL W P-5'-P-CCNC: 26 U/L (ref 0–45)
BILIRUB SERPL-MCNC: 0.2 MG/DL (ref 0.2–1.3)
BUN SERPL-MCNC: 16 MG/DL (ref 7–30)
CALCIUM SERPL-MCNC: 9.4 MG/DL (ref 8.5–10.1)
CHLORIDE SERPL-SCNC: 99 MMOL/L (ref 94–109)
CHOLEST SERPL-MCNC: 193 MG/DL
CO2 SERPL-SCNC: 27 MMOL/L (ref 20–32)
CREAT SERPL-MCNC: 0.81 MG/DL (ref 0.52–1.04)
CREAT UR-MCNC: 44 MG/DL
GFR SERPL CREATININE-BSD FRML MDRD: 72 ML/MIN/1.7M2
GLUCOSE SERPL-MCNC: 99 MG/DL (ref 70–99)
HBA1C MFR BLD: 5.4 % (ref 0–5.6)
HDLC SERPL-MCNC: 53 MG/DL
LDLC SERPL CALC-MCNC: 95 MG/DL
MICROALBUMIN UR-MCNC: 9 MG/L
MICROALBUMIN/CREAT UR: 20.8 MG/G CR (ref 0–25)
NONHDLC SERPL-MCNC: 140 MG/DL
POTASSIUM SERPL-SCNC: 3.3 MMOL/L (ref 3.4–5.3)
PROT SERPL-MCNC: 8 G/DL (ref 6.8–8.8)
SODIUM SERPL-SCNC: 135 MMOL/L (ref 133–144)
TRIGL SERPL-MCNC: 223 MG/DL

## 2018-11-15 PROCEDURE — 82043 UR ALBUMIN QUANTITATIVE: CPT | Performed by: INTERNAL MEDICINE

## 2018-11-15 PROCEDURE — 36415 COLL VENOUS BLD VENIPUNCTURE: CPT | Performed by: INTERNAL MEDICINE

## 2018-11-15 PROCEDURE — G0145 SCR C/V CYTO,THINLAYER,RESCR: HCPCS | Performed by: INTERNAL MEDICINE

## 2018-11-15 PROCEDURE — 83036 HEMOGLOBIN GLYCOSYLATED A1C: CPT | Performed by: INTERNAL MEDICINE

## 2018-11-15 PROCEDURE — 80061 LIPID PANEL: CPT | Performed by: INTERNAL MEDICINE

## 2018-11-15 PROCEDURE — 99396 PREV VISIT EST AGE 40-64: CPT | Performed by: INTERNAL MEDICINE

## 2018-11-15 PROCEDURE — 80053 COMPREHEN METABOLIC PANEL: CPT | Performed by: INTERNAL MEDICINE

## 2018-11-15 PROCEDURE — 99213 OFFICE O/P EST LOW 20 MIN: CPT | Mod: 25 | Performed by: INTERNAL MEDICINE

## 2018-11-15 PROCEDURE — 87624 HPV HI-RISK TYP POOLED RSLT: CPT | Performed by: INTERNAL MEDICINE

## 2018-11-15 RX ORDER — AMLODIPINE BESYLATE 10 MG/1
10 TABLET ORAL DAILY
Qty: 90 TABLET | Refills: 1 | Status: SHIPPED | OUTPATIENT
Start: 2018-11-15

## 2018-11-15 RX ORDER — CITALOPRAM HYDROBROMIDE 10 MG/1
10 TABLET ORAL DAILY
Qty: 90 TABLET | Refills: 1 | Status: SHIPPED | OUTPATIENT
Start: 2018-11-15

## 2018-11-15 RX ORDER — HYDROCHLOROTHIAZIDE 25 MG/1
25 TABLET ORAL DAILY
Qty: 90 TABLET | Refills: 1 | Status: SHIPPED | OUTPATIENT
Start: 2018-11-15 | End: 2020-03-26

## 2018-11-15 RX ORDER — LISINOPRIL 40 MG/1
40 TABLET ORAL DAILY
Qty: 90 TABLET | Refills: 1 | Status: SHIPPED | OUTPATIENT
Start: 2018-11-15 | End: 2020-03-26

## 2018-11-15 RX ORDER — METOPROLOL TARTRATE 50 MG
50 TABLET ORAL 2 TIMES DAILY
Qty: 180 TABLET | Refills: 1 | Status: SHIPPED | OUTPATIENT
Start: 2018-11-15

## 2018-11-15 ASSESSMENT — ANXIETY QUESTIONNAIRES
2. NOT BEING ABLE TO STOP OR CONTROL WORRYING: NOT AT ALL
7. FEELING AFRAID AS IF SOMETHING AWFUL MIGHT HAPPEN: NOT AT ALL
1. FEELING NERVOUS, ANXIOUS, OR ON EDGE: NOT AT ALL
5. BEING SO RESTLESS THAT IT IS HARD TO SIT STILL: SEVERAL DAYS
6. BECOMING EASILY ANNOYED OR IRRITABLE: NOT AT ALL
GAD7 TOTAL SCORE: 2
3. WORRYING TOO MUCH ABOUT DIFFERENT THINGS: SEVERAL DAYS
IF YOU CHECKED OFF ANY PROBLEMS ON THIS QUESTIONNAIRE, HOW DIFFICULT HAVE THESE PROBLEMS MADE IT FOR YOU TO DO YOUR WORK, TAKE CARE OF THINGS AT HOME, OR GET ALONG WITH OTHER PEOPLE: NOT DIFFICULT AT ALL

## 2018-11-15 ASSESSMENT — ENCOUNTER SYMPTOMS
CONSTIPATION: 0
NERVOUS/ANXIOUS: 1
NERVOUS/ANXIOUS: 0
EYE PAIN: 0
DIZZINESS: 0
HEMATOCHEZIA: 0
DIARRHEA: 0
COUGH: 0
CHILLS: 0
FEVER: 0
ABDOMINAL PAIN: 0
FREQUENCY: 0
HEMATURIA: 0

## 2018-11-15 ASSESSMENT — PATIENT HEALTH QUESTIONNAIRE - PHQ9
5. POOR APPETITE OR OVEREATING: NOT AT ALL
SUM OF ALL RESPONSES TO PHQ QUESTIONS 1-9: 3

## 2018-11-15 NOTE — MR AVS SNAPSHOT
After Visit Summary   11/15/2018    Keisha Mcrae    MRN: [id number]           Patient Information     Date Of Birth          1960        Visit Information        Provider Department      11/15/2018 11:30 AM Lula Cuba MD [facility]        Today's Diagnoses     Routine history and physical examination of adult    -  1    Screen for colon cancer        Visit for screening mammogram        Essential hypertension with goal blood pressure less than 130/85        Anxiety        CARDIOVASCULAR SCREENING; LDL GOAL LESS THAN 130        Elevated hemoglobin A1c        Encounter for monitoring diuretic therapy          Care Instructions      Preventive Health Recommendations  Female Ages 50 - 64    Yearly exam: See your health care provider every year in order to  o Review health changes.   o Discuss preventive care.    o Review your medicines if your doctor has prescribed any.      Get a Pap test every three years (unless you have an abnormal result and your provider advises testing more often).    If you get Pap tests with HPV test, you only need to test every 5 years, unless you have an abnormal result.     You do not need a Pap test if your uterus was removed (hysterectomy) and you have not had cancer.    You should be tested each year for STDs (sexually transmitted diseases) if you're at risk.     Have a mammogram every 1 to 2 years.    Have a colonoscopy at age 50, or have a yearly FIT test (stool test). These exams screen for colon cancer.      Have a cholesterol test every 5 years, or more often if advised.    Have a diabetes test (fasting glucose) every three years. If you are at risk for diabetes, you should have this test more often.     If you are at risk for osteoporosis (brittle bone disease), think about having a bone density scan (DEXA).    Shots: Get a flu shot each year. Get a tetanus shot every 10 years.    Nutrition:     Eat at least 5 servings of fruits and  vegetables each day.    Eat whole-grain bread, whole-wheat pasta and brown rice instead of white grains and rice.    Get adequate Calcium and Vitamin D.     Lifestyle    Exercise at least 150 minutes a week (30 minutes a day, 5 days a week). This will help you control your weight and prevent disease.    Limit alcohol to one drink per day.    No smoking.     Wear sunscreen to prevent skin cancer.     See your dentist every six months for an exam and cleaning.    See your eye doctor every 1 to 2 years.            Follow-ups after your visit        Your next 10 appointments already scheduled     Dec 07, 2018  9:00 AM CST   MA SCREENING DIGITAL BILATERAL with [facility]   [facility]    [address] [facility] [address] [phone]           How do I prepare for my exam? (Food and drink instructions) No Food and Drink Restrictions.  How do I prepare for my exam? (Other instructions) Do not use any powder, lotion or deodorant under your arms or on your breast. If you do, we will ask you to remove it before your exam.  What should I wear: Wear comfortable, two-piece clothing.  How long does the exam take: Most scans will take 15 minutes.  What should I bring: Bring any previous mammograms from other facilities or have them mailed to the breast center.  Do I need a :  No  is needed.  What do I need to tell my doctor: If you have any allergies, tell your care team.  What should I do after the exam: No restrictions, You may resume normal activities.  What is this test: This test is an x-ray of the breast to look for breast disease. The breast is pressed between two plates to flatten and spread the tissue. An X-ray is taken of the breast from different angles.  Who should I call with questions: If you have any questions, please call the Imaging Department where you will have your exam. Directions, parking instructions, and other information is available on our  "website, [email or web address].  Other information about my exam Three-dimensional (3D) mammograms are available at [facility] locations in [facility], [place], [place], [facility], [place], [facility] and [place].  Health locations include [place] and the [facility] [place] [facility] in [place].  Benefits of 3D mammograms include * Improved rate of cancer detection * Decreases your chance of having to go back for more tests, which means fewer: * \"False-positive\" results (This means that there is an abnormal area but it isn't cancer.) * Invasive testing procedures, such as a biopsy or surgery * Can provide clearer images of the breast if you have dense breast tissue.  *3D mammography is an optional exam that anyone can have with a 2D mammogram. It doesn't replace or take the place of a 2D mammogram. 2D mammograms remain an effective screening test for all women.  Not all insurance companies cover the cost of a 3D mammogram. Check with your insurance.              Future tests that were ordered for you today     Open Future Orders        Priority Expected Expires Ordered    Fecal colorectal cancer screen (FIT) Routine 12/6/2018 2/7/2019 11/15/2018    MA Screening Digital Bilateral Routine  11/15/2019 11/15/2018            Who to contact     If you have questions or need follow up information about today's clinic visit or your schedule please contact [facility] directly at [phone].  Normal or non-critical lab and imaging results will be communicated to you by MyChart, letter or phone within 4 business days after the clinic has received the results. If you do not hear from us within 7 days, please contact the clinic through MyChart or phone. If you have a critical or abnormal lab result, we will notify you by phone as soon as possible.  Submit refill requests through [employer] or call your pharmacy and they will forward the refill request to us. Please allow 3 business days for " "your refill to be completed.          Additional Information About Your Visit        Board a [employer] Information     [employer] gives you secure access to your electronic health record. If you see a primary care provider, you can also send messages to your care team and make appointments. If you have questions, please call your primary care clinic.  If you do not have a primary care provider, please call [phone] and they will assist you.        Care EveryWhere ID     This is your Care EveryWhere ID. This could be used by other organizations to access your [facility] medical records  [id number]        Your Vitals Were     Pulse Temperature Respirations Height Last Period Pulse Oximetry    77 99.1  F (37.3  C) (Oral) 17 5' 5.5\" (1.664 m) 03/21/1999 96%    BMI (Body Mass Index)                   35.22 kg/m2            Blood Pressure from Last 3 Encounters:   11/15/18 130/78   06/15/17 132/86   06/09/17 (!) 142/92    Weight from Last 3 Encounters:   11/15/18 214 lb 14.4 oz (97.5 kg)   06/15/17 223 lb 1.6 oz (101.2 kg)   02/27/17 225 lb 11.2 oz (102.4 kg)              We Performed the Following     Albumin Random Urine Quantitative with Creat Ratio     Comprehensive metabolic panel     Hemoglobin A1c     HPV High Risk Types DNA Cervical     Lipid panel reflex to direct LDL Non-fasting     Pap imaged thin layer screen with HPV - recommended age 30 - 65 years (select HPV order below)          Today's Medication Changes          These changes are accurate as of 11/15/18 12:18 PM.  If you have any questions, ask your nurse or doctor.               These medicines have changed or have updated prescriptions.        Dose/Directions    amLODIPine 10 MG tablet   Commonly known as:  NORVASC   This may have changed:  See the new instructions.   Used for:  Essential hypertension with goal blood pressure less than 130/85   Changed by:  Lula Cuba MD        Dose:  10 mg   Take 1 tablet (10 mg) by mouth daily   Quantity:  90 " tablet   Refills:  1       hydrochlorothiazide 25 MG tablet   Commonly known as:  HYDRODIURIL   This may have changed:  See the new instructions.   Used for:  Essential hypertension with goal blood pressure less than 130/85   Changed by:  Lula Cuba MD        Dose:  25 mg   Take 1 tablet (25 mg) by mouth daily   Quantity:  90 tablet   Refills:  1       lisinopril 40 MG tablet   Commonly known as:  PRINIVIL/ZESTRIL   This may have changed:  See the new instructions.   Used for:  Essential hypertension with goal blood pressure less than 130/85   Changed by:  Lula Cuba MD        Dose:  40 mg   Take 1 tablet (40 mg) by mouth daily   Quantity:  90 tablet   Refills:  1       metoprolol tartrate 50 MG tablet   Commonly known as:  LOPRESSOR   This may have changed:  See the new instructions.   Used for:  Essential hypertension with goal blood pressure less than 130/85   Changed by:  Lula Cuba MD        Dose:  50 mg   Take 1 tablet (50 mg) by mouth 2 times daily   Quantity:  180 tablet   Refills:  1            Where to get your medicines      These medications were sent to [facility] [place], [place] - [address]  [address] [facility] [address]     Phone:  [phone]     amLODIPine 10 MG tablet    citalopram 10 MG tablet    hydrochlorothiazide 25 MG tablet    lisinopril 40 MG tablet    metoprolol tartrate 50 MG tablet                Primary Care Provider Office Phone # Fax #    Lula Cuba [phone]       [address]  [facility] [address]        Equal Access to Services     [facility]: Hadii aad ku hadasho Soomaali, waaxda luqadaha, qaybta kaalmada adeegyada, waxay idiin haynehal alvarez. So [facility] [phone].    ATENCIÓN: Si habla español, tiene a flannery disposición servicios gratuitos de asistencia lingüística. Llame al [phone].    We comply with applicable federal civil rights laws and [place] laws. We  do not discriminate on the basis of race, color, national origin, age, disability, sex, sexual orientation, or gender identity.            Thank you!     Thank you for choosing [facility]  for your care. Our goal is always to provide you with excellent care. Hearing back from our patients is one way we can continue to improve our services. Please take a few minutes to complete the written survey that you may receive in the mail after your visit with us. Thank you!             Your Updated Medication List - Protect others around you: Learn how to safely use, store and throw away your medicines at www.disposemymeds.org.          This list is accurate as of 11/15/18 12:18 PM.  Always use your most recent med list.                   Brand Name Dispense Instructions for use Diagnosis    amLODIPine 10 MG tablet    NORVASC    90 tablet    Take 1 tablet (10 mg) by mouth daily    Essential hypertension with goal blood pressure less than 130/85       citalopram 10 MG tablet    celeXA    90 tablet    Take 1 tablet (10 mg) by mouth daily    Anxiety       hydrochlorothiazide 25 MG tablet    HYDRODIURIL    90 tablet    Take 1 tablet (25 mg) by mouth daily    Essential hypertension with goal blood pressure less than 130/85       lisinopril 40 MG tablet    PRINIVIL/ZESTRIL    90 tablet    Take 1 tablet (40 mg) by mouth daily    Essential hypertension with goal blood pressure less than 130/85       metoprolol tartrate 50 MG tablet    LOPRESSOR    180 tablet    Take 1 tablet (50 mg) by mouth 2 times daily    Essential hypertension with goal blood pressure less than 130/85

## 2018-11-15 NOTE — PROGRESS NOTES
SUBJECTIVE:   CC: Keisha Mcrae is an 58 year old woman who presents for preventive health visit.     Physical   Annual:     Getting at least 3 servings of Calcium per day:  Yes    Bi-annual eye exam:  NO    Dental care twice a year:  Yes    Sleep apnea or symptoms of sleep apnea:  Excessive snoring    Diet:  Low salt    Frequency of exercise:  2-3 days/week    Duration of exercise:  Greater than 60 minutes    Taking medications regularly:  Yes    Medication side effects:  None    Additional concerns today:  No  Hypertension     Diet:  Low salt  Frequency of exercise:  2-3 days/week  Duration of exercise:  Greater than 60 minutes  Taking medications regularly:  Yes  Medication side effects:  None  Additional concerns today:  No  Anxiety   Pertinent negatives include no abdominal pain, chest pain, chills, congestion, coughing or fever.         Hypertension Follow-up      Outpatient blood pressures are being checked at home.  Results are 130-140/70-80.    Low Salt Diet: no added salt    Anxiety Follow-Up    Status since last visit: stable    Other associated symptoms:None    Complicating factors:   Significant life event: Lost her job this summer  Current substance abuse: None  Depression symptoms: No  JESS-7 SCORE 2/27/2017 6/15/2017 11/15/2018   Total Score - - -   Total Score 6 8 2       JESS-7    Today's PHQ-2 Score:   PHQ-2 ( 1999 [employer]) 11/15/2018   Q1: Little interest or pleasure in doing things 0   Q2: Feeling down, depressed or hopeless 0   PHQ-2 Score 0   Q1: Little interest or pleasure in doing things Not at all   Q2: Feeling down, depressed or hopeless Not at all   PHQ-2 Score 0       Abuse: Current or Past(Physical, Sexual or Emotional)- No  Do you feel safe in your environment - Yes    Social History   Substance Use Topics     Smoking status: Never Smoker     Smokeless tobacco: Never Used     Alcohol use 0.0 oz/week     0 Standard drinks or equivalent per week      Comment: occassional glass of wine   2-3 times a week     Alcohol Use 11/15/2018   If you drink alcohol do you typically have greater than 3 drinks per day OR greater than 7 drinks per week? No       Reviewed orders with patient.  Reviewed health maintenance and updated orders accordingly - Yes  Labs reviewed in EPIC  BP Readings from Last 3 Encounters:   11/15/18 130/78   06/15/17 132/86   17 (!) 142/92    Wt Readings from Last 3 Encounters:   11/15/18 214 lb 14.4 oz (97.5 kg)   06/15/17 223 lb 1.6 oz (101.2 kg)   17 225 lb 11.2 oz (102.4 kg)                  Patient Active Problem List   Diagnosis     CARDIOVASCULAR SCREENING; LDL GOAL LESS THAN 130     Anxiety     Essential hypertension with goal blood pressure less than 130/85     Morbid obesity, unspecified obesity type (H)     Past Surgical History:   Procedure Laterality Date     C  DELIVERY ONLY      x 2     C LIGATE FALLOPIAN TUBE      with last cearean       Social History   Substance Use Topics     Smoking status: Never Smoker     Smokeless tobacco: Never Used     Alcohol use 0.0 oz/week     0 Standard drinks or equivalent per week      Comment: occassional glass of wine  2-3 times a week     Family History   Problem Relation Age of Onset     Respiratory Father      emphysema     C.A.D. Paternal Grandmother          Current Outpatient Prescriptions   Medication Sig Dispense Refill     amLODIPine (NORVASC) 10 MG tablet Take 1 tablet (10 mg) by mouth daily 90 tablet 1     citalopram (CELEXA) 10 MG tablet Take 1 tablet (10 mg) by mouth daily 90 tablet 1     hydrochlorothiazide (HYDRODIURIL) 25 MG tablet Take 1 tablet (25 mg) by mouth daily 90 tablet 1     lisinopril (PRINIVIL/ZESTRIL) 40 MG tablet Take 1 tablet (40 mg) by mouth daily 90 tablet 1     metoprolol tartrate (LOPRESSOR) 50 MG tablet Take 1 tablet (50 mg) by mouth 2 times daily 180 tablet 1     Allergies   Allergen Reactions     Penicillins      possible-runs in family       Patient over age 50, mutual decision to  "screen reflected in health maintenance.    Pertinent mammograms are reviewed under the imaging tab.  History of abnormal Pap smear: NO - age 30- 65 PAP every 3 years recommended  PAP / HPV 10/1/2014 4/15/1999   PAP NIL -   PAP DATE - [facility] - DNR     Reviewed and updated as needed this visit by clinical staff  Tobacco  Allergies  Meds         Reviewed and updated as needed this visit by Provider        Past Medical History:   Diagnosis Date     Hypertension      Shingles outbreak ~3/2008      Past Surgical History:   Procedure Laterality Date     C  DELIVERY ONLY      x 2     C LIGATE FALLOPIAN TUBE      with last cearean       Review of Systems   Constitutional: Negative for chills and fever.   HENT: Negative for congestion and ear pain.    Eyes: Negative for pain.   Respiratory: Negative for cough.    Cardiovascular: Negative for chest pain.   Gastrointestinal: Negative for abdominal pain, constipation, diarrhea and hematochezia.   Genitourinary: Negative for frequency, genital sores and hematuria.   Neurological: Negative for dizziness.   Psychiatric/Behavioral: The patient is nervous/anxious.           OBJECTIVE:   /78  Pulse 77  Temp 99.1  F (37.3  C) (Oral)  Resp 17  Ht 5' 5.5\" (1.664 m)  Wt 214 lb 14.4 oz (97.5 kg)  LMP 1999  SpO2 96%  BMI 35.22 kg/m2  Physical Exam  GENERAL: healthy, alert and no distress  HENT: ear canals and TM's normal, nose and mouth without ulcers or lesions  NECK: no adenopathy, no asymmetry, masses, or scars and thyroid normal to palpation  RESP: lungs clear to auscultation - no rales, rhonchi or wheezes  CV: regular rates and rhythm, normal S1 S2, no S3 or S4, peripheral pulses strong and no peripheral edema  ABDOMEN: soft, nontender, no hepatosplenomegaly, no masses and bowel sounds normal   (female): normal female external genitalia, normal urethral meatus, vaginal mucosa, normal cervix/adnexa/uterus without masses or discharge  MS: no gross " musculoskeletal defects noted, no edema  SKIN: no suspicious lesions or rashes  NEURO: Normal strength and tone, mentation intact and speech normal  PSYCH: mentation appears normal, affect normal/bright      ASSESSMENT/PLAN:   (Z00.00) Routine history and physical examination of adult  (primary encounter diagnosis)  Comment: HEALTH CARE MAINTENANCE reviewed  Plan: Pap imaged thin layer screen with HPV -         recommended age 30 - 65 years (select HPV order        below), HPV High Risk Types DNA Cervical          (I10) Essential hypertension with goal blood pressure less than 130/85  Comment: 4 agents to control BLOOD PRESSURE; meds reviewed ; well tolerated; due for labs to assess electrolytes, renal function, etc  Plan: Comprehensive metabolic panel, Albumin Random         Urine Quantitative with Creat Ratio, metoprolol        tartrate (LOPRESSOR) 50 MG tablet, lisinopril         (PRINIVIL/ZESTRIL) 40 MG tablet,         hydrochlorothiazide (HYDRODIURIL) 25 MG tablet,        amLODIPine (NORVASC) 10 MG tablet          (F41.9) Anxiety  Comment: Citalopram has been effective and well tolerated  JESS-7 SCORE 2/27/2017 6/15/2017 11/15/2018   Total Score - - -   Total Score 6 8 2     Plan: citalopram (CELEXA) 10 MG tablet          (Z13.6) CARDIOVASCULAR SCREENING; LDL GOAL LESS THAN 130  Comment:   Plan: Lipid panel reflex to direct LDL Non-fasting          (R73.09) Elevated hemoglobin A1c  Comment: prediabetes; risk for DM over time; continue to be active and monitor weight  Plan: Hemoglobin A1c          (Z51.81,  Z79.899) Encounter for monitoring diuretic therapy  Comment: monitor renal function and potassium  Plan: Comprehensive metabolic panel    (Z12.11) Screen for colon cancer  Comment: yearly FIT or consider colonoscopy screening  Plan: Fecal colorectal cancer screen (FIT)          (Z12.31) Visit for screening mammogram  Comment: Clinical Breast Exam completed  Plan: MA Screening Digital Bilateral         "    COUNSELING:  Reviewed preventive health counseling, as reflected in patient instructions       Regular exercise       Healthy diet/nutrition       Colon cancer screening    BP Readings from Last 1 Encounters:   11/15/18 130/78     Estimated body mass index is 35.22 kg/(m^2) as calculated from the following:    Height as of this encounter: 5' 5.5\" (1.664 m).    Weight as of this encounter: 214 lb 14.4 oz (97.5 kg).      Weight management plan: Discussed healthy diet and exercise guidelines and patient will follow up in 12 months in clinic to re-evaluate.     reports that she has never smoked. She has never used smokeless tobacco.      Counseling Resources:  ATP IV Guidelines  Pooled Cohorts Equation Calculator  Breast Cancer Risk Calculator  FRAX Risk Assessment  ICSI Preventive Guidelines  Dietary Guidelines for Americans, 2010  USDA's MyPlate  ASA Prophylaxis  Lung CA Screening    Lula Cuba MD  Internal Medicine   [facility] [place]  25 minutes in addition to HEALTH CARE MAINTENANCE are spent with patient addressing multiple chronic health concern, over 50% of that time spent providing counselling, discussing and reviewing medical conditions/concerns, meds and potential side effects.    Answers for HPI/ROS submitted by the patient on 11/15/2018   PHQ-2 Score: 0    "

## 2018-11-16 ASSESSMENT — ANXIETY QUESTIONNAIRES: GAD7 TOTAL SCORE: 2

## 2018-11-17 NOTE — PROGRESS NOTES
Labs reviewed. Potassium s slightly low; best treated with adding potassium rich foods- Potatoes, bananas, tomatoes, etc.  Cholesterol with elevated Trig, no diabetes. Best treated with diet and exercise.  Kidney and liver function are normal  Pt advised via My Chart.

## 2018-11-19 LAB
COPATH REPORT: NORMAL
PAP: NORMAL

## 2018-11-20 LAB
FINAL DIAGNOSIS: NORMAL
HPV HR 12 DNA CVX QL NAA+PROBE: NEGATIVE
HPV16 DNA SPEC QL NAA+PROBE: NEGATIVE
HPV18 DNA SPEC QL NAA+PROBE: NEGATIVE
SPECIMEN DESCRIPTION: NORMAL
SPECIMEN SOURCE CVX/VAG CYTO: NORMAL

## 2018-12-07 ENCOUNTER — RADIANT APPOINTMENT (OUTPATIENT)
Dept: MAMMOGRAPHY | Facility: CLINIC | Age: 58
End: 2018-12-07
Payer: COMMERCIAL

## 2018-12-07 DIAGNOSIS — Z12.11 SCREEN FOR COLON CANCER: ICD-10-CM

## 2018-12-07 DIAGNOSIS — Z12.31 VISIT FOR SCREENING MAMMOGRAM: ICD-10-CM

## 2018-12-07 LAB — HEMOCCULT STL QL IA: NEGATIVE

## 2018-12-07 PROCEDURE — 82274 ASSAY TEST FOR BLOOD FECAL: CPT | Performed by: INTERNAL MEDICINE

## 2018-12-07 PROCEDURE — 77067 SCR MAMMO BI INCL CAD: CPT | Mod: TC

## 2019-09-27 ENCOUNTER — HEALTH MAINTENANCE LETTER (OUTPATIENT)
Age: 59
End: 2019-09-27

## 2019-10-26 ENCOUNTER — HEALTH MAINTENANCE LETTER (OUTPATIENT)
Age: 59
End: 2019-10-26

## 2020-03-15 ENCOUNTER — HEALTH MAINTENANCE LETTER (OUTPATIENT)
Age: 60
End: 2020-03-15

## 2020-03-25 DIAGNOSIS — I10 ESSENTIAL HYPERTENSION WITH GOAL BLOOD PRESSURE LESS THAN 130/85: ICD-10-CM

## 2020-03-25 NOTE — TELEPHONE ENCOUNTER
Pending Prescriptions:                       Disp   Refills    lisinopril (ZESTRIL) 40 MG tablet         90 tab*1            Sig: Take 1 tablet (40 mg) by mouth daily    hydrochlorothiazide (HYDRODIURIL) 25 MG t*90 tab*1            Sig: Take 1 tablet (25 mg) by mouth daily      Medication Refill  Who is calling: Keisha (pt)  What medication are you calling about (include dose and sig)?: Lisinopril 40 mg tablet, hydrochlorothiazide 25 mg tablet  Controlled Substance Agreement on file: No  Who prescribed the medication?: Lula Cuba MD  Do you need a refill? Yes: has maybe 1 week left of medication  When did you use the medication last? 3/25/2020  Patient offered an appointment? No, last appointment 11/15/2018  Do you have any questions or concerns?  Yes: Pt is not wanting to do a visit until after this (covid-19) has cleared  Requested Pharmacy: [facility]  Okay to leave a detailed message?: Yes at Cell number on file:    Telephone Information:   Mobile [phone]

## 2020-03-26 RX ORDER — HYDROCHLOROTHIAZIDE 25 MG/1
25 TABLET ORAL DAILY
Qty: 90 TABLET | Refills: 0 | Status: SHIPPED | OUTPATIENT
Start: 2020-03-26

## 2020-03-26 RX ORDER — LISINOPRIL 40 MG/1
40 TABLET ORAL DAILY
Qty: 90 TABLET | Refills: 0 | Status: SHIPPED | OUTPATIENT
Start: 2020-03-26

## 2020-03-26 NOTE — TELEPHONE ENCOUNTER
"Ok per [facility] [place] protocol    Requested Prescriptions   Pending Prescriptions Disp Refills     lisinopril (ZESTRIL) 40 MG tablet 90 tablet 1     Sig: Take 1 tablet (40 mg) by mouth daily       ACE Inhibitors (Including Combos) Protocol Failed - 3/25/2020  2:51 PM        Failed - Blood pressure under 140/90 in past 12 months     BP Readings from Last 3 Encounters:   11/15/18 130/78   06/15/17 132/86   06/09/17 (!) 142/92                 Failed - Recent (12 mo) or future (30 days) visit within the authorizing provider's specialty     Patient has had an office visit with the authorizing provider or a provider within the authorizing providers department within the previous 12 mos or has a future within next 30 days. See \"Patient Info\" tab in inbasket, or \"Choose Columns\" in Meds & Orders section of the refill encounter.              Failed - Normal serum creatinine on file in past 12 months     Recent Labs   Lab Test 11/15/18  1222   CR 0.81       Ok to refill medication if creatinine is low          Failed - Normal serum potassium on file in past 12 months     Recent Labs   Lab Test 11/15/18  1222   POTASSIUM 3.3*             Passed - Medication is active on med list        Passed - Patient is age 18 or older        Passed - No active pregnancy on record        Passed - No positive pregnancy test within past 12 months           hydrochlorothiazide (HYDRODIURIL) 25 MG tablet 90 tablet 1     Sig: Take 1 tablet (25 mg) by mouth daily       Diuretics (Including Combos) Protocol Failed - 3/25/2020  2:51 PM        Failed - Blood pressure under 140/90 in past 12 months     BP Readings from Last 3 Encounters:   11/15/18 130/78   06/15/17 132/86   06/09/17 (!) 142/92                 Failed - Recent (12 mo) or future (30 days) visit within the authorizing provider's specialty     Patient has had an office visit with the authorizing provider or a provider within the authorizing providers department within the previous 12 mos or " "has a future within next 30 days. See \"Patient Info\" tab in inbasket, or \"Choose Columns\" in Meds & Orders section of the refill encounter.              Failed - Normal serum creatinine on file in past 12 months     Recent Labs   Lab Test 11/15/18  1222   CR 0.81              Failed - Normal serum potassium on file in past 12 months     Recent Labs   Lab Test 11/15/18  1222   POTASSIUM 3.3*                    Failed - Normal serum sodium on file in past 12 months     Recent Labs   Lab Test 11/15/18  1222                 Passed - Medication is active on med list        Passed - Patient is age 18 or older        Passed - No active pregancy on record        Passed - No positive pregnancy test in past 12 months             "

## 2021-01-09 ENCOUNTER — HEALTH MAINTENANCE LETTER (OUTPATIENT)
Age: 61
End: 2021-01-09

## 2021-03-21 NOTE — Clinical Note
Keisha SHARRON Mcrae was evaluated in a specialty clinic today at which time her blood pressure was noted to be elevated.  She  has been advised to follow up with her primary provider or with a nurse only visit. We are also routing this message to her primary provider as an FYI.   yes

## 2021-05-08 ENCOUNTER — HEALTH MAINTENANCE LETTER (OUTPATIENT)
Age: 61
End: 2021-05-08

## 2021-10-23 ENCOUNTER — HEALTH MAINTENANCE LETTER (OUTPATIENT)
Age: 61
End: 2021-10-23

## 2022-06-04 ENCOUNTER — HEALTH MAINTENANCE LETTER (OUTPATIENT)
Age: 62
End: 2022-06-04

## 2022-10-09 ENCOUNTER — HEALTH MAINTENANCE LETTER (OUTPATIENT)
Age: 62
End: 2022-10-09

## 2023-05-21 ENCOUNTER — HEALTH MAINTENANCE LETTER (OUTPATIENT)
Age: 63
End: 2023-05-21

## 2023-06-10 ENCOUNTER — HEALTH MAINTENANCE LETTER (OUTPATIENT)
Age: 63
End: 2023-06-10